# Patient Record
Sex: MALE | Race: WHITE | Employment: OTHER | URBAN - METROPOLITAN AREA
[De-identification: names, ages, dates, MRNs, and addresses within clinical notes are randomized per-mention and may not be internally consistent; named-entity substitution may affect disease eponyms.]

---

## 2018-10-07 ENCOUNTER — APPOINTMENT (OUTPATIENT)
Dept: GENERAL RADIOLOGY | Facility: HOSPITAL | Age: 63
End: 2018-10-07
Attending: EMERGENCY MEDICINE

## 2018-10-07 ENCOUNTER — HOSPITAL ENCOUNTER (INPATIENT)
Facility: HOSPITAL | Age: 63
LOS: 1 days | Discharge: HOME | DRG: 603 | End: 2018-10-09
Attending: EMERGENCY MEDICINE | Admitting: STUDENT IN AN ORGANIZED HEALTH CARE EDUCATION/TRAINING PROGRAM

## 2018-10-07 ENCOUNTER — HOSPITAL ENCOUNTER (EMERGENCY)
Facility: HOSPITAL | Age: 63
Discharge: HOME | End: 2018-10-07
Attending: EMERGENCY MEDICINE

## 2018-10-07 VITALS
RESPIRATION RATE: 18 BRPM | WEIGHT: 190 LBS | SYSTOLIC BLOOD PRESSURE: 142 MMHG | HEIGHT: 68 IN | HEART RATE: 79 BPM | DIASTOLIC BLOOD PRESSURE: 80 MMHG | TEMPERATURE: 98 F | BODY MASS INDEX: 28.79 KG/M2 | OXYGEN SATURATION: 98 %

## 2018-10-07 DIAGNOSIS — L03.113 CELLULITIS OF RIGHT UPPER EXTREMITY: ICD-10-CM

## 2018-10-07 DIAGNOSIS — S42.401A CLOSED FRACTURE OF RIGHT ELBOW, INITIAL ENCOUNTER: Primary | ICD-10-CM

## 2018-10-07 DIAGNOSIS — M70.21 OLECRANON BURSITIS OF RIGHT ELBOW: ICD-10-CM

## 2018-10-07 DIAGNOSIS — L03.113 CELLULITIS OF RIGHT UPPER EXTREMITY: Primary | ICD-10-CM

## 2018-10-07 PROCEDURE — 20610 DRAIN/INJ JOINT/BURSA W/O US: CPT

## 2018-10-07 PROCEDURE — 99284 EMERGENCY DEPT VISIT MOD MDM: CPT

## 2018-10-07 PROCEDURE — 99219 INITIAL OBSERVATION CARE,LEVL II: CPT | Performed by: STUDENT IN AN ORGANIZED HEALTH CARE EDUCATION/TRAINING PROGRAM

## 2018-10-07 PROCEDURE — 73080 X-RAY EXAM OF ELBOW: CPT | Performed by: EMERGENCY MEDICINE

## 2018-10-07 RX ORDER — ONDANSETRON 2 MG/ML
4 INJECTION INTRAMUSCULAR; INTRAVENOUS EVERY 6 HOURS PRN
Status: DISCONTINUED | OUTPATIENT
Start: 2018-10-07 | End: 2018-10-09

## 2018-10-07 RX ORDER — ACETAMINOPHEN 325 MG/1
650 TABLET ORAL EVERY 4 HOURS PRN
Status: DISCONTINUED | OUTPATIENT
Start: 2018-10-07 | End: 2018-10-09

## 2018-10-07 RX ORDER — SODIUM PHOSPHATE, DIBASIC AND SODIUM PHOSPHATE, MONOBASIC 7; 19 G/133ML; G/133ML
1 ENEMA RECTAL ONCE AS NEEDED
Status: DISCONTINUED | OUTPATIENT
Start: 2018-10-07 | End: 2018-10-09

## 2018-10-07 RX ORDER — AMOXICILLIN AND CLAVULANATE POTASSIUM 875; 125 MG/1; MG/1
1 TABLET, FILM COATED ORAL 2 TIMES DAILY
Qty: 20 TABLET | Refills: 0 | Status: ON HOLD | OUTPATIENT
Start: 2018-10-07 | End: 2018-10-09

## 2018-10-07 RX ORDER — BISACODYL 10 MG
10 SUPPOSITORY, RECTAL RECTAL
Status: DISCONTINUED | OUTPATIENT
Start: 2018-10-07 | End: 2018-10-09

## 2018-10-07 RX ORDER — HYDROCODONE BITARTRATE AND ACETAMINOPHEN 5; 325 MG/1; MG/1
1 TABLET ORAL EVERY 4 HOURS PRN
Status: DISCONTINUED | OUTPATIENT
Start: 2018-10-07 | End: 2018-10-09

## 2018-10-07 RX ORDER — HYDROCODONE BITARTRATE AND ACETAMINOPHEN 5; 325 MG/1; MG/1
2 TABLET ORAL EVERY 4 HOURS PRN
Status: DISCONTINUED | OUTPATIENT
Start: 2018-10-07 | End: 2018-10-09

## 2018-10-07 RX ORDER — TETANUS AND DIPHTHERIA TOXOIDS ADSORBED 2; 2 [LF]/.5ML; [LF]/.5ML
0.5 INJECTION INTRAMUSCULAR ONCE
Status: COMPLETED | OUTPATIENT
Start: 2018-10-07 | End: 2018-10-07

## 2018-10-07 RX ORDER — SODIUM CHLORIDE 9 MG/ML
INJECTION, SOLUTION INTRAVENOUS CONTINUOUS
Status: DISCONTINUED | OUTPATIENT
Start: 2018-10-07 | End: 2018-10-09

## 2018-10-07 RX ORDER — POLYETHYLENE GLYCOL 3350 17 G/17G
17 POWDER, FOR SOLUTION ORAL DAILY PRN
Status: DISCONTINUED | OUTPATIENT
Start: 2018-10-07 | End: 2018-10-09

## 2018-10-07 RX ORDER — CLINDAMYCIN PHOSPHATE 600 MG/50ML
600 INJECTION INTRAVENOUS ONCE
Status: COMPLETED | OUTPATIENT
Start: 2018-10-07 | End: 2018-10-07

## 2018-10-07 RX ORDER — METOCLOPRAMIDE HYDROCHLORIDE 5 MG/ML
10 INJECTION INTRAMUSCULAR; INTRAVENOUS EVERY 8 HOURS PRN
Status: DISCONTINUED | OUTPATIENT
Start: 2018-10-07 | End: 2018-10-09

## 2018-10-07 RX ORDER — KETOROLAC TROMETHAMINE 30 MG/ML
30 INJECTION, SOLUTION INTRAMUSCULAR; INTRAVENOUS ONCE
Status: COMPLETED | OUTPATIENT
Start: 2018-10-07 | End: 2018-10-07

## 2018-10-07 NOTE — ED PROVIDER NOTES
Patient Seen in: [facility] Emergency Department    History   Patient presents with:  Upper Extremity Injury (musculoskeletal)    Stated Complaint: Injured Rt elbow 2 weeks ago but it it now swollen and painful    HPI    33-year-old male who present display       Xr Elbow, Complete (min 3 Views), Right (cpt=73080)    Result Date: 10/7/2018  CONCLUSION:  There is suggestion of a fracture of the olecranon prostate    Dictated by: Monique Nair MD on 10/07/2018 at 10:50     Approved by: Avel Silva [id number] MG Oral Tab  Take 1 tablet by mouth 2 (two) times daily for 10 days.   Qty: 20 tablet Refills: 0

## 2018-10-08 PROCEDURE — 99232 SBSQ HOSP IP/OBS MODERATE 35: CPT | Performed by: HOSPITALIST

## 2018-10-08 RX ORDER — CEFAZOLIN SODIUM/WATER 2 G/20 ML
2 SYRINGE (ML) INTRAVENOUS EVERY 8 HOURS
Status: DISCONTINUED | OUTPATIENT
Start: 2018-10-08 | End: 2018-10-08

## 2018-10-08 RX ORDER — ONDANSETRON 4 MG/1
4 TABLET, ORALLY DISINTEGRATING ORAL EVERY 4 HOURS PRN
Qty: 20 TABLET | Refills: 0 | Status: SHIPPED | OUTPATIENT
Start: 2018-10-08

## 2018-10-08 RX ORDER — HYDROCODONE BITARTRATE AND ACETAMINOPHEN 5; 325 MG/1; MG/1
1 TABLET ORAL EVERY 4 HOURS PRN
Qty: 20 TABLET | Refills: 0 | Status: SHIPPED | OUTPATIENT
Start: 2018-10-08

## 2018-10-08 RX ORDER — CALCIUM CARBONATE 200(500)MG
500 TABLET,CHEWABLE ORAL
Status: DISCONTINUED | OUTPATIENT
Start: 2018-10-08 | End: 2018-10-09

## 2018-10-08 NOTE — ED PROVIDER NOTES
Patient Seen in: [facility] Emergency Department    History   Patient presents with:  Cellulitis (integumentary, infectious)    Stated Complaint: arm swelling    HPI    77-year-old male comes the hospital with worsening pain and redness and swelling bursa second erythema of the right elbow with erythema that spread from his midportion of his upper arm all the way to his their of his wrist that is warm and tender but otherwise neurovascular intact at this time.   Neuro: No focal deficits noted    ED Cou Noted POA    Cellulitis of right upper extremity L03.113 10/7/2018 Unknown

## 2018-10-08 NOTE — H&P
[facility]  History and Physical     Tana Stoner Patient Status:  Emergency    10/27/1955 MRN [id number]   Location [address] [facility] Attending Lily Arcos MD   Hosp Day # 0 PCP None Pcp     Chief Complaint: Arm swell rhonchi. Cardiovascular: S1, S2. Regular rate and rhythm. No murmurs, rubs or gallops. Equal pulses. Chest and Back: No tenderness or deformity. Abdomen: Soft, nontender, nondistended. Positive bowel sounds. No rebound, guarding or organomegaly.   Enrike Mcfadden

## 2018-10-08 NOTE — PLAN OF CARE
NURSING ADMISSION NOTE      Patient admitted via Cart  Oriented to room 524. Safety precautions initiated. Bed in low position. Call light in reach.

## 2018-10-08 NOTE — PROGRESS NOTES
Multidisciplinary Discharge Rounds held 10/8/2018. Treatment team members present today include , , Charge Nurse,  Nurse, RT, PT and Pharmacy caring for [facility] [employer].      Other care providers present:    Patient Active Problem

## 2018-10-08 NOTE — CM/SW NOTE
10/08/18 1400   CM/SW Screening   Referral Source    Information Source Chart review;Nursing rounds   Patient's Mental Status Alert;Oriented   Patient's [address]   Patient lives with Spouse   Patient Status Prior to Admission

## 2018-10-08 NOTE — PAYOR COMM NOTE
--------------  ADMISSION REVIEW     10/7         History   Patient presents with:  Cellulitis (integumentary, infectious)     Stated Complaint: arm swelling     HPI     68-year-old male comes the hospital with worsening pain and redness and swelling in hi is noted over the right arm with swollen bursa second erythema of the right elbow with erythema that spread from his midportion of his upper arm all the way to his their of his wrist that is warm and tender but otherwise neurovascular intact at this time. 10/7/2018 Unknown

## 2018-10-08 NOTE — OCCUPATIONAL THERAPY NOTE
OT order received for RUE bursitis. Pt with xray R elbow showing fracture, per chart possibly old. Pt has not been seen by ortho, per RN plan for follow up with ortho OP. Discussed with RN plan for OP OT prn after seen by OP ortho. IP OT orders completed.

## 2018-10-08 NOTE — PROGRESS NOTES
Pharmacy Note: Renal dose adjustment of Unasyn    Deana Mcconnell is a 57year old male who has been prescribed Unasyn. CrCl is 48.5 ml/min so the dose has been adjusted  to 3gm IV q8h per hospital renal dose adjustment protocol.   Pharmacy will follow and

## 2018-10-08 NOTE — CONSULTS
INFECTIOUS DISEASE CONSULT NOTE    Yoselin Richter Patient Status:  Observation    10/27/1955 MRN [id number]   [facility] 5NW-A Attending Sebastian Cazares MD   Hosp Day # 0 PCP None Pcp GM/118ML enema 133 mL, 1 enema, Rectal, Once PRN  •  0.9%  NaCl infusion, , Intravenous, Continuous  •  influenza vaccine split quad (FLULAVAL) ages 6 months to 65 years inj 0.5ml, 0.5 mL, Intramuscular, Prior to discharge    Review of Systems:    Complete Injured Rt elbow 2 weeks ago but it it now swollen and painful  PATIENT STATED HISTORY: (As transcribed by Technologist)  The patient shares that he hit 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   He has pain to the posterior aspect of h

## 2018-10-08 NOTE — CONSULTS
[facility] dosing service    Initial Pharmacokinetic Consult for Vancomycin Dosing     Flores Harrison is a 57year old male admitted on 10/7/2018 who is being treated for bursitis .   Pharmacy has been asked to dose Vancomycin by Dr. Asia Bridges    He has No Kn

## 2018-10-08 NOTE — PROGRESS NOTES
[facility]  Progress Note     Camden Nett Patient Status:  Observation    10/27/1955 MRN [id number]   [facility] 5NW-A Attending Michael Inman MD   Hosp Day # 0 PCP None Pcp     Chief Complaint:  Arm pain swelling   worsening Followed by   • [START ON 10/9/2018] vancomycin  15 mg/kg Intravenous Q12H       ASSESSMENT / PLAN:       1. Cellulitis of RUE and bursitis   1. IV   vanco started per ID  2. Ortho to see if drainage needed   3. ID following  4. CRP, sed P    5.  No foreign

## 2018-10-09 VITALS
RESPIRATION RATE: 20 BRPM | HEART RATE: 66 BPM | SYSTOLIC BLOOD PRESSURE: 134 MMHG | OXYGEN SATURATION: 95 % | WEIGHT: 207 LBS | TEMPERATURE: 98 F | DIASTOLIC BLOOD PRESSURE: 78 MMHG | BODY MASS INDEX: 31 KG/M2

## 2018-10-09 PROCEDURE — 99239 HOSP IP/OBS DSCHRG MGMT >30: CPT | Performed by: INTERNAL MEDICINE

## 2018-10-09 RX ORDER — CEFADROXIL 500 MG/1
500 CAPSULE ORAL 2 TIMES DAILY
Qty: 28 CAPSULE | Refills: 0 | Status: SHIPPED | OUTPATIENT
Start: 2018-10-09 | End: 2018-10-23

## 2018-10-09 RX ORDER — SULFAMETHOXAZOLE AND TRIMETHOPRIM 800; 160 MG/1; MG/1
1 TABLET ORAL 2 TIMES DAILY
Qty: 28 TABLET | Refills: 0 | Status: SHIPPED | OUTPATIENT
Start: 2018-10-09 | End: 2018-10-23

## 2018-10-09 NOTE — PLAN OF CARE
Pt discharged to home.  alertx4 Pt given scripts very anxious to go home declined waiting for the wheelchair left unit ambulatory with belongings

## 2018-10-09 NOTE — PROGRESS NOTES
Robbie Cronin Dr Patient Status:  Inpatient    10/27/1955 MRN [id number]   [facility]NW-A Attending Jorge Suárez MD   Hosp Day # 1 PCP None Pcp         S:  No issues overnight.     O:  Blood pressure 142/83, pulse 68,

## 2018-10-09 NOTE — PROGRESS NOTES
[facility]  Progress Note     Sage Rojas Patient Status:  Observation    10/27/1955 MRN [id number]   [facility] 5NW-A Attending Anne Marie Kim MD   Hosp Day # 1 PCP None Pcp     Chief Complaint:  Arm pain swelling   worsening But needs to f/u   3. Pain control  norco   2. Un clear tetanus vaccination history  3.  KIERRA     Quality:  · DVT Prophylaxis: SCD, ambulatory   · CODE status: full  · Lopez: no    Plan of care:   Oral abx at dc   Needs to f/u w/ortho / ID   Stressed need to

## 2018-10-09 NOTE — PROGRESS NOTES
[facility]  TEL: [phone]  FAX: [phone]    Yohannes Lopez Patient Status:  Inpatient    10/27/1955 MRN [id number]   [facility] 5NW-A Attending Nissa Ray MD   Hosp Day # 1 PCP None Pcp (cpt=73080)    Result Date: 10/7/2018  PROCEDURE:  XR ELBOW, COMPLETE (MIN 3 VIEWS), RIGHT (CPT=73080)  TECHNIQUE:  Three views were obtained. COMPARISON:  None.   INDICATIONS:  Injured Rt elbow 2 weeks ago but it it now swollen and painful  PATIENT STATED

## 2018-10-09 NOTE — CONSULTS
[facility]    Report of Consultation    Perla Tavera Patient Status:  Inpatient    10/27/1955 MRN [id number]   [facility] 5NW-A Attending Nate Hendricks MD   Hosp Day # 0 PCP None Pcp     Date of Admission:  10/7/2018  Date of Cons hydroxide (MILK OF MAGNESIA) 400 MG/5ML suspension 30 mL 30 mL Oral Daily PRN   bisacodyl (DULCOLAX) rectal suppository 10 mg 10 mg Rectal Daily PRN   FLEET ENEMA (FLEET) 7-19 GM/118ML enema 133 mL 1 enema Rectal Once PRN   0.9%  NaCl infusion  Intravenous 10/08/2018         Imaging:  Xr Elbow, Complete (min 3 Views), Right (cpt=73080)    Result Date: 10/7/2018  CONCLUSION:  There is suggestion of a fracture of the olecranon prostate    Dictated by: Izzy Caba MD on 10/07/2018 at 10:50     Approved b

## 2018-10-10 NOTE — DISCHARGE SUMMARY
[facility]  DISCHARGE SUMMARY     Evelyn Schrader Patient Status:  Inpatient    10/27/1955 MRN [id number]   [facility] 5NW-A Attending No att. providers found   Hosp Day # 1 PCP None Pcp     Date of Admission: 10/7/2018  Date of Disc and fever patient came back to the ER was admitted. Patient was started on IV antibiotics consult with infectious disease and orthopedics was obtained  Patient was initially started on Unasyn/Cleocin  and then was switched to Ancef.   Patient's induration weeks      Lab/Test results pending at Discharge:   · None    Consultants:  • Orthopedics, infectious disease social work    Discharge Medication List:     Discharge Medications      START taking these medications      Instructions Prescription details   C Exam:      General: No acute distress. Respiratory: Clear to auscultation bilaterally. No wheezes. No rhonchi. Cardiovascular: S1, S2. Regular rate and rhythm. No murmurs, rubs or gallops. Abdomen: Soft, nontender, nondistended.   Positive bowel sounds

## (undated) NOTE — ED AVS SNAPSHOT
Claudeen Maffucci   MRN: [id number]    Department:  [facility] Emergency Department   Date of Visit:  10/7/2018           Disclosure     Insurance plans vary and the physician(s) referred by the ER may not be covered by your plan.  Please contact your tell this physician (or your personal doctor if your instructions are to return to your personal doctor) about any new or lasting problems. The primary care or specialist physician will see patients referred from the [facility] Emergency Department.  Denise Rodriguez